# Patient Record
Sex: MALE | Race: WHITE | URBAN - METROPOLITAN AREA
[De-identification: names, ages, dates, MRNs, and addresses within clinical notes are randomized per-mention and may not be internally consistent; named-entity substitution may affect disease eponyms.]

---
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
## 2019-11-06 ENCOUNTER — OFFICE VISIT (OUTPATIENT)
Dept: FAMILY MEDICINE | Facility: CLINIC | Age: 19
End: 2019-11-06
Payer: COMMERCIAL

## 2019-11-06 VITALS
WEIGHT: 141.6 LBS | DIASTOLIC BLOOD PRESSURE: 72 MMHG | HEART RATE: 68 BPM | BODY MASS INDEX: 22.51 KG/M2 | SYSTOLIC BLOOD PRESSURE: 112 MMHG | RESPIRATION RATE: 16 BRPM

## 2019-11-06 DIAGNOSIS — L25.9 CONTACT DERMATITIS, UNSPECIFIED CONTACT DERMATITIS TYPE, UNSPECIFIED TRIGGER: ICD-10-CM

## 2019-11-06 DIAGNOSIS — R41.840 INATTENTION: Primary | ICD-10-CM

## 2019-11-06 PROCEDURE — 99214 OFFICE O/P EST MOD 30 MIN: CPT | Performed by: NURSE PRACTITIONER

## 2019-11-06 RX ORDER — PREDNISONE 20 MG/1
TABLET ORAL
Qty: 10 TABLET | Refills: 0 | Status: SHIPPED | OUTPATIENT
Start: 2019-11-06 | End: 2019-11-27

## 2019-11-06 NOTE — PROGRESS NOTES
Subjective     Hayden Redmond is a 19 year old male who presents to clinic today for the following health issues:    HPI   Medication Followup of Adderall - would like to restart, has not been on for more than a year    Rash  - x 3 weeks  -itchy red spots all over his whole body       Patient Active Problem List   Diagnosis     Major depressive disorder, recurrent episode, moderate (H)     No past surgical history on file.    Social History     Tobacco Use     Smoking status: Never Smoker     Smokeless tobacco: Never Used   Substance Use Topics     Alcohol use: Not on file     No family history on file.      Current Outpatient Medications   Medication Sig Dispense Refill     predniSONE (DELTASONE) 20 MG tablet Take one tablet twice a day for 5 days. 10 tablet 0     order for DME Equipment being ordered: Wrist/Thumb, UNIV, LT (Patient not taking: Reported on 11/6/2019) 1 Device 0     No Known Allergies  No lab results found.   BP Readings from Last 3 Encounters:   11/06/19 112/72   12/26/16 106/68   10/26/16 109/63    Wt Readings from Last 3 Encounters:   11/06/19 64.2 kg (141 lb 9.6 oz)   12/26/16 67.2 kg (148 lb 3.2 oz) (56 %)*   10/26/16 64.6 kg (142 lb 6.4 oz) (48 %)*     * Growth percentiles are based on CDC (Boys, 2-20 Years) data.                  Reviewed and updated as needed this visit by Provider         Review of Systems   ROS COMP: Constitutional, HEENT, cardiovascular, pulmonary, gi and gu systems are negative, except as otherwise noted.      Objective    /72 (BP Location: Right arm, Patient Position: Chair, Cuff Size: Adult Large)   Pulse 68   Resp 16   Wt 64.2 kg (141 lb 9.6 oz)   BMI 22.51 kg/m    Body mass index is 22.51 kg/m .  Physical Exam   GENERAL: healthy, alert and no distress  EYES: Eyes grossly normal to inspection, PERRL and conjunctivae and sclerae normal  HENT: ear canals and TM's normal, nose and mouth without ulcers or lesions  NECK: no adenopathy, no asymmetry, masses,  or scars and thyroid normal to palpation  RESP: lungs clear to auscultation - no rales, rhonchi or wheezes  CV: regular rate and rhythm, normal S1 S2, no S3 or S4, no murmur, click or rub, no peripheral edema and peripheral pulses strong  ABDOMEN: soft, nontender, no hepatosplenomegaly, no masses and bowel sounds normal  MS: no gross musculoskeletal defects noted, no edema  SKIN: no suspicious lesions. Examination of the rash to chest and arms  reveals:  dry, slightly raised, red patches   NEURO: Normal strength and tone, mentation intact and speech normal  PSYCH: mentation appears normal, affect normal/bright            Assessment & Plan     (P91.618) Inattention  (primary encounter diagnosis)  Comment: Patient states he has a history of ADHD and was on medications greater than a year ago.  Patient is unable to recall where he was evaluated and where he was prescribed the medications.  Did review with patient we will need to do a formal evaluation and assessment to see if there is truly underlying ADHD before prescribing any medications.  Referral has been made for him to have ADHD testing.  Plan: MENTAL HEALTH REFERRAL  - Adult; Assessments         and Testing; ADHD; [facility]: [facility] [phone]; We will contact you to         schedule the appointment or please call with         any questions      (G22.9) Contact dermatitis, unspecified contact dermatitis type, unspecified trigger  Comment: Rash most representative of contact dermatitis.  Small concerned that it could be scabies patient will be started on prednisone if not improving patient should contact me and we will consider treating him for scabies.  Plan: predniSONE (DELTASONE) 20 MG tablet       See Patient Instructions    Return in about 1 week (around 11/13/2019), or if symptoms worsen or fail to improve.    [facility]

## 2019-11-06 NOTE — NURSING NOTE
"Chief Complaint   Patient presents with     Recheck Medication     ADHD       Initial /72 (BP Location: Right arm, Patient Position: Chair, Cuff Size: Adult Large)   Pulse 68   Resp 16   Wt 64.2 kg (141 lb 9.6 oz)   BMI 22.51 kg/m   Estimated body mass index is 22.51 kg/m  as calculated from the following:    Height as of 4/26/16: 1.689 m (5' 6.5\").    Weight as of this encounter: 64.2 kg (141 lb 9.6 oz).    Patient presents to the clinic using No DME    Health Maintenance that is potentially due pending provider review:  NONE    Mey Hoff MA  11:33 AM 11/6/2019  .      "

## 2019-11-06 NOTE — PATIENT INSTRUCTIONS
"  Patient Education     Contact Dermatitis  Contact dermatitis is a skin rash caused by something that touches the skin and makes it irritated and inflamed. Your skin may be red, swollen, dry, and may be cracked. Blisters may form and ooze. The rash will itch.  Contact dermatitis can form on the face and neck, backs of hands, forearms, genitals, and lower legs.  People can get contact dermatitis from lots of sources. These include:    Plants such as poison ivy, oak, or sumac    Chemicals in hair dyes and rinses, soaps, solvents, waxes, fingernail polish, and deodorants     Jewelry or watchbands made of nickel  Contact dermatitis is not passed from person to person.  Talk with your healthcare provider about what may have caused the rash. A type of allergy testing called \"patch testing\" may be used to discover what you are allergic to. You will need to avoid the source of your rash in the future to prevent it from coming back.  Treatment is done to relieve itching and prevent the rash from coming back. The rash should go away in a few days to a few weeks.  Home care  Your healthcare provider may prescribe medicine to relieve swelling and itching. Follow all instructions when using these medicines.  General care:    Avoid anything that heats up your skin, such as hot showers or baths, or direct sunlight. This can make itching worse.    Apply cold compresses to soothe your sores to help relieve your symptoms. Do this for 30 minutes 3 to 4 times a day. You can make a cold compress by soaking a cloth in cold water. Squeeze out excess water. You can add colloidal oatmeal to the water to help reduce itching. For severe itching in a small area, apply an ice pack wrapped in a thin towel. Do this for 20 minutes 3 to 4 times a day.    You can also try wet dressings. One way to do this is to wear a wet piece of clothing under a dry one. Wear a damp shirt under a dry shirt if your upper body is affected. This can relieve itching " and prevent you from scratching the affected area.    You can also help relieve large areas of itching by taking a lukewarm bath with colloidal oatmeal added to the water.    Use hydrocortisone cream for redness and irritation, unless another medicine was prescribed. You can also use benzocaine anesthetic cream or spray. Calamine lotion can also relieve mild symptoms.    Use oral diphenhydramine to help reduce itching. You can buy this antihistamine at drug and grocery stores. It can make you sleepy, so use lower doses during the daytime. Or you can use loratadine. This is an antihistamine that will not make you sleepy. Do not use diphenhydramine if you have glaucoma or have trouble urinating due to an enlarged prostate.    If a plant causes your rash, make sure to wash your skin and the clothes you were wearing when you came into contact with the plant. This is to wash away the plant oils that gave you the rash and prevent more or worse symptoms.    Stay away from the substance or object that causes your symptoms. If you can t avoid it, wear gloves or some other type of protection.  Follow-up care  Follow up with your healthcare provider, or as advised.  When to seek medical advice  Call your healthcare provider right away if any of these occur:    Spreading of the rash to other parts of your body    Severe swelling of your face, eyelids, mouth, throat or tongue    Trouble urinating due to swelling in the genital area    Fever of 100.4 F (38 C) or higher    Redness or swelling that gets worse    Pain that gets worse    Foul-smelling fluid leaking from the skin    Yellow-brown crusts on the open blisters  Date Last Reviewed: 9/1/2016 2000-2018 [employer]. [address] [place] [address], [place] [address]. All rights reserved. This information is not intended as a substitute for professional medical care. Always follow your healthcare professional's instructions.

## 2019-11-18 ENCOUNTER — OFFICE VISIT (OUTPATIENT)
Dept: FAMILY MEDICINE | Facility: CLINIC | Age: 19
End: 2019-11-18
Payer: COMMERCIAL

## 2019-11-18 ENCOUNTER — NURSE TRIAGE (OUTPATIENT)
Dept: NURSING | Facility: CLINIC | Age: 19
End: 2019-11-18

## 2019-11-18 VITALS
HEIGHT: 68 IN | WEIGHT: 140.8 LBS | BODY MASS INDEX: 21.34 KG/M2 | TEMPERATURE: 97.7 F | DIASTOLIC BLOOD PRESSURE: 82 MMHG | SYSTOLIC BLOOD PRESSURE: 114 MMHG | HEART RATE: 64 BPM

## 2019-11-18 DIAGNOSIS — L25.9 CONTACT DERMATITIS, UNSPECIFIED CONTACT DERMATITIS TYPE, UNSPECIFIED TRIGGER: Primary | ICD-10-CM

## 2019-11-18 DIAGNOSIS — Z23 NEED FOR PROPHYLACTIC VACCINATION AND INOCULATION AGAINST INFLUENZA: ICD-10-CM

## 2019-11-18 DIAGNOSIS — Z20.7 SCABIES EXPOSURE: ICD-10-CM

## 2019-11-18 PROCEDURE — 99213 OFFICE O/P EST LOW 20 MIN: CPT | Performed by: NURSE PRACTITIONER

## 2019-11-18 RX ORDER — TRIAMCINOLONE ACETONIDE 1 MG/G
CREAM TOPICAL 2 TIMES DAILY
Qty: 45 G | Refills: 1 | Status: SHIPPED | OUTPATIENT
Start: 2019-11-18 | End: 2019-11-27

## 2019-11-18 RX ORDER — PERMETHRIN 50 MG/G
CREAM TOPICAL
Qty: 60 G | Refills: 1 | Status: SHIPPED | OUTPATIENT
Start: 2019-11-18 | End: 2019-11-27

## 2019-11-18 ASSESSMENT — MIFFLIN-ST. JEOR: SCORE: 1623.16

## 2019-11-18 ASSESSMENT — PATIENT HEALTH QUESTIONNAIRE - PHQ9: SUM OF ALL RESPONSES TO PHQ QUESTIONS 1-9: 17

## 2019-11-18 NOTE — LETTER
My Depression Action Plan  Name: Hayden Redmond   Date of Birth 2000  Date: 11/18/2019    [facility]   My clinic: [place] [id number] [place] [id number] [phone]          GREEN    ZONE   Good Control    What it looks like:     Things are going generally well. You have normal up s and down s. You may even feel depressed from time to time, but bad moods usually last less than a day.   What you need to do:  1. Continue to care for yourself (see self care plan)  2. Check your depression survival kit and update it as needed  3. Follow your physician s recommendations including any medication.  4. Do not stop taking medication unless you consult with your physician first.           YELLOW         ZONE Getting Worse    What it looks like:     Depression is starting to interfere with your life.     It may be hard to get out of bed; you may be starting to isolate yourself from others.    Symptoms of depression are starting to last most all day and this has happened for several days.     You may have suicidal thoughts but they are not constant.   What you need to do:     1. Call your care team, your response to treatment will improve if you keep your care team informed of your progress. Yellow periods are signs an adjustment may need to be made.     2. Continue your self-care, even if you have to fake it!    3. Talk to someone in your support network    4. Open up your depression survival kit           RED    ZONE Medical Alert - Get Help    What it looks like:     Depression is seriously interfering with your life.     You may experience these or other symptoms: You can t get out of bed most days, can t work or engage in other necessary activities, you have trouble taking care of basic hygiene, or basic responsibilities, thoughts of suicide or death that will not go away, self-injurious behavior.     What you need to do:  1. Call  your care team and request a same-day appointment. If they are not available (weekends or after hours) call your local crisis line, emergency room or 911.            Depression Self Care Plan / Survival Kit    Self-Care for Depression  Here s the deal. Your body and mind are really not as separate as most people think.  What you do and think affects how you feel and how you feel influences what you do and think. This means if you do things that people who feel good do, it will help you feel better.  Sometimes this is all it takes.  There is also a place for medication and therapy depending on how severe your depression is, so be sure to consult with your medical provider and/ or Behavioral Health Consultant if your symptoms are worsening or not improving.     In order to better manage my stress, I will:    Exercise  Get some form of exercise, every day. This will help reduce pain and release endorphins, the  feel good  chemicals in your brain. This is almost as good as taking antidepressants!  This is not the same as joining a gym and then never going! (they count on that by the way ) It can be as simple as just going for a walk or doing some gardening, anything that will get you moving.      Hygiene   Maintain good hygiene (Get out of bed in the morning, Make your bed, Brush your teeth, Take a shower, and Get dressed like you were going to work, even if you are unemployed).  If your clothes don't fit try to get ones that do.    Diet  I will strive to eat foods that are good for me, drink plenty of water, and avoid excessive sugar, caffeine, alcohol, and other mood-altering substances.  Some foods that are helpful in depression are: complex carbohydrates, B vitamins, flaxseed, fish or fish oil, fresh fruits and vegetables.    Psychotherapy  I agree to participate in Individual Therapy (if recommended).    Medication  If prescribed medications, I agree to take them.  Missing doses can result in serious side effects.   I understand that drinking alcohol, or other illicit drug use, may cause potential side effects.  I will not stop my medication abruptly without first discussing it with my provider.    Staying Connected With Others  I will stay in touch with my friends, family members, and my primary care provider/team.    Use your imagination  Be creative.  We all have a creative side; it doesn t matter if it s oil painting, sand castles, or mud pies! This will also kick up the endorphins.    Witness Beauty  (AKA stop and smell the roses) Take a look outside, even in mid-winter. Notice colors, textures. Watch the squirrels and birds.     Service to others  Be of service to others.  There is always someone else in need.  By helping others we can  get out of ourselves  and remember the really important things.  This also provides opportunities for practicing all the other parts of the program.    Humor  Laugh and be silly!  Adjust your TV habits for less news and crime-drama and more comedy.    Control your stress  Try breathing deep, massage therapy, biofeedback, and meditation. Find time to relax each day.     My support system    Clinic Contact:  Phone number:    Contact 1:  Phone number:    Contact 2:  Phone number:    [employer]/:  Phone number:    Therapist:  Phone number:    [facility] crisis center:    Phone number:    Other community support:  Phone number:

## 2019-11-18 NOTE — PROGRESS NOTES
Subjective     Hayden Redmond is a 19 year old male who presents to clinic today for the following health issues:    HPI   Derm problem       Duration: 1.5 months     Description  Location: all over the body except for face and neck   Itching: severe    Intensity:  moderate    Accompanying signs and symptoms: started out as water blisters on his hands, spreading, in between fingers as well, redness     History (similar episodes/previous evaluation): None    Precipitating or alleviating factors:  New exposures:  None  Recent travel: no    Has changed soaps, stopped using soaps, changed bedding     Therapies tried and outcome: Prednisone- helped until stopped taking it       Patient Active Problem List   Diagnosis     Major depressive disorder, recurrent episode, moderate (H)     History reviewed. No pertinent surgical history.    Social History     Tobacco Use     Smoking status: Never Smoker     Smokeless tobacco: Never Used   Substance Use Topics     Alcohol use: Not on file     History reviewed. No pertinent family history.      Current Outpatient Medications   Medication Sig Dispense Refill     permethrin (ELIMITE) 5 % external cream Apply cream from head to toe (except the face); leave on for 8-14 hours then wash off with water; reapply in 1 week if live mites appear. 60 g 1     triamcinolone (KENALOG) 0.1 % external cream Apply topically 2 times daily 45 g 1     order for DME Equipment being ordered: Wrist/Thumb, UNIV, LT (Patient not taking: Reported on 11/6/2019) 1 Device 0     predniSONE (DELTASONE) 20 MG tablet Take one tablet twice a day for 5 days. (Patient not taking: Reported on 11/18/2019) 10 tablet 0     No Known Allergies  No lab results found.   BP Readings from Last 3 Encounters:   11/18/19 114/82   11/06/19 112/72   12/26/16 106/68    Wt Readings from Last 3 Encounters:   11/18/19 63.9 kg (140 lb 12.8 oz)   11/06/19 64.2 kg (141 lb 9.6 oz)   12/26/16 67.2 kg (148 lb 3.2 oz) (56 %)*     *  "Growth percentiles are based on CDC (Boys, 2-20 Years) data.               Reviewed and updated as needed this visit by Provider         Review of Systems   ROS COMP: Constitutional, HEENT, cardiovascular, pulmonary, gi and gu systems are negative, except as otherwise noted.      Objective    /82   Pulse 64   Temp 97.7  F (36.5  C) (Tympanic)   Ht 1.727 m (5' 8\")   Wt 63.9 kg (140 lb 12.8 oz)   BMI 21.41 kg/m    Body mass index is 21.41 kg/m .  Physical Exam   GENERAL: healthy, alert and no distress  EYES: Eyes grossly normal to inspection, PERRL and conjunctivae and sclerae normal  NECK: no adenopathy, no asymmetry, masses, or scars and thyroid normal to palpation  RESP: lungs clear to auscultation - no rales, rhonchi or wheezes  CV: regular rate and rhythm, normal S1 S2, no S3 or S4, no murmur, click or rub, no peripheral edema and peripheral pulses strong  MS: no gross musculoskeletal defects noted, no edema  SKIN:  Examination of the rash to arms hands and groin reveals: Contact dermatitis  dry, slightly raised, red patches   NEURO: Normal strength and tone, mentation intact and speech normal  PSYCH: mentation appears normal, affect normal/bright       Assessment & Plan     (L25.9) Contact dermatitis, unspecified contact dermatitis type, unspecified trigger  (primary encounter diagnosis)  Comment: Symptoms also representative of contact dermatitis did have some mild improvement with the prednisone.  Will have patient try Kenalog and follow-up with dermatology since is not resolving  Plan: DERMATOLOGY REFERRAL, triamcinolone (KENALOG)         0.1 % external cream      (Z20.89) Scabies exposure  Comment: Small potential could be scabies will treat with Elimite  cream to rule out scabies p  roxy: permethrin (ELIMITE) 5 % external cream        (Z23) Need for prophylactic vaccination and inoculation against influenza  Comment:   Plan:        See Patient Instructions    Return in about 1 week (around " 11/25/2019), or if symptoms worsen or fail to improve.    [facility]

## 2019-11-18 NOTE — NURSING NOTE
"Chief Complaint   Patient presents with     Derm Problem     Imm/Inj     Flu Shot       Initial /82   Pulse 64   Temp 97.7  F (36.5  C) (Tympanic)   Ht 1.727 m (5' 8\")   Wt 63.9 kg (140 lb 12.8 oz)   BMI 21.41 kg/m   Estimated body mass index is 21.41 kg/m  as calculated from the following:    Height as of this encounter: 1.727 m (5' 8\").    Weight as of this encounter: 63.9 kg (140 lb 12.8 oz).    Patient presents to the clinic using No DME    Health Maintenance that is potentially due pending provider review:  PHQ9, Depression action plan    Possibly completing today per provider review. Pt declines flu shot today.    Is there anyone who you would like to be able to receive your results? No  If yes have patient fill out SNEHA      "

## 2019-11-18 NOTE — PATIENT INSTRUCTIONS
Patient Education     Understanding Contact Dermatitis     A cool, moist compress can help reduce itching.     Contact dermatitis is a common type of skin rash. It s caused by something that touches the skin and makes it irritated and inflamed. It can occur on skin on any part of the body, such as the face, neck, hands, arms, and legs. Contact dermatitis is not spread from person to person.  Often, the reaction of contact dermatitis occurs 1 to 2 days after contact with the offending agent.  How to say it  OKSANA-tact [id number]-DI-[id number]   What causes contact dermatitis?  It s caused by something that irritates the skin, or that creates an allergic reaction on the skin. People can get contact dermatitis from many kinds of things. These include:    Plant oils in poison ivy, oak, and sumac    Chemicals in household , solvents, and glue    Chemicals in makeup, soap, laundry detergent, perfume, acne cream, and hair products    Certain medicines, such as neomycin, bacitracin, benzocaine, and thimerosal    Metals such as nickel, found in some jewelry and watch bands     The sticky material on the back of bandages and tape (adhesive)    Things that can cause tiny breaks in the skin, such as wood, fiberglass, metal tools, and plant thorns    Rubber latex in surgical gloves and other medical supplies  Dermatitis can also be caused by the skin being damp for long periods of time. This can happen from washing your hands too often, or working with wet materials.  Symptoms of contact dermatitis  Symptoms can include skin that is:    Blistered    Burning    Cracked    Dry    Itchy    Painful    Red    Rough, thickened, and leathery    Swollen    Warm  The blisters may ooze fluid and form crusts.  Treatment for contact dermatitis  Treatment is done to help relieve itching and reduce inflammation. The rash should go away in a few days to a few weeks. Treatments include:    Cool, moist compress. Use a clean damp cloth. Put it on  the area for 20 to 30 minutes, 5 to 6 times a day for the first 3 days.    Steroid cream or ointment. You can apply this medicine several times a day on clean skin.    Oral corticosteroid. Your healthcare provider may prescribe this medicine if you have severe skin symptoms on a large part of your body.  Your healthcare provider may give you a steroid injection instead of pills.    Oral antihistamine. This medicine can help reduce itching.    Colloidal oatmeal bath. Soaking in water with colloidal oatmeal can help soothe skin.    Plain cream, lotion, or ointment. Cream, lotion, or ointment without medicine can help to soothe and protect your skin.  Living with contact dermatitis  Talk with your healthcare provider about what may have caused your contact dermatitis. Patch testing may help you figure out what caused the rash so you can avoid further contact with it. Once you learn what caused your rash, make sure to avoid that substance. If your skin comes into contact with it again, make sure to wash your skin right away. If you can t avoid the substance, wear gloves or other protective clothing before you touch it. Or use a cream, lotion, or ointment to protect your skin.  When to call your healthcare provider  Call your healthcare provider right away if you have any of these:    Fever of 100.4 F (38 C) or higher, or as directed    Symptoms that don t get better, or get worse    New symptoms   Date Last Reviewed: 5/1/2016 2000-2018 [employer]. [address] [place] [address], [place] [address]. All rights reserved. This information is not intended as a substitute for professional medical care. Always follow your healthcare professional's instructions.

## 2019-11-18 NOTE — TELEPHONE ENCOUNTER
Patient asking to clarify what time his appointment is this date.  Asking if there is any way to push appointment back 2 hours; transferred to scheduling for assistance.

## 2019-11-27 ENCOUNTER — OFFICE VISIT (OUTPATIENT)
Dept: FAMILY MEDICINE | Facility: CLINIC | Age: 19
End: 2019-11-27
Payer: COMMERCIAL

## 2019-11-27 VITALS
WEIGHT: 143 LBS | TEMPERATURE: 97.8 F | RESPIRATION RATE: 16 BRPM | HEART RATE: 92 BPM | SYSTOLIC BLOOD PRESSURE: 122 MMHG | BODY MASS INDEX: 21.67 KG/M2 | HEIGHT: 68 IN | DIASTOLIC BLOOD PRESSURE: 72 MMHG

## 2019-11-27 DIAGNOSIS — N50.89 GENITAL LESION, MALE: Primary | ICD-10-CM

## 2019-11-27 PROCEDURE — 87529 HSV DNA AMP PROBE: CPT | Performed by: NURSE PRACTITIONER

## 2019-11-27 PROCEDURE — 99213 OFFICE O/P EST LOW 20 MIN: CPT | Performed by: NURSE PRACTITIONER

## 2019-11-27 SDOH — HEALTH STABILITY: MENTAL HEALTH: HOW OFTEN DO YOU HAVE A DRINK CONTAINING ALCOHOL?: NEVER

## 2019-11-27 ASSESSMENT — MIFFLIN-ST. JEOR: SCORE: 1633.14

## 2019-11-27 NOTE — PROGRESS NOTES
"Subjective     Hayden Redmond is a 19 year old male who presents to clinic today for the following health issues:    HPI   STI Check - x 1 mo has rash in groin area. If wearing tight pants makes things worse. Had has \"pimple\" like spots.  Did resolve when on prednisone but came back.     Was started on prednisone and rash resolved, returned when the prednisone course was complete.  Then used permethrin and symptoms resolved.  New lesions started on shaft of penis only.  No new sexual partners.  Started out looking like \"pimples\".  Wore tight jeans and symptoms worsened.     Patient Active Problem List   Diagnosis     Major depressive disorder, recurrent episode, moderate (H)     History reviewed. No pertinent surgical history.    Social History     Tobacco Use     Smoking status: Never Smoker     Smokeless tobacco: Never Used   Substance Use Topics     Alcohol use: Yes     Frequency: Never     History reviewed. No pertinent family history.      No current outpatient medications on file.     No Known Allergies    Reviewed and updated as needed this visit by Provider  Tobacco  Allergies  Meds  Problems  Med Hx  Surg Hx  Fam Hx         Review of Systems   ROS COMP: Constitutional, HEENT, cardiovascular, pulmonary, gi and gu systems are negative, except as otherwise noted.      Objective    /72 (Cuff Size: Adult Regular)   Pulse 92   Temp 97.8  F (36.6  C) (Tympanic)   Resp 16   Ht 1.727 m (5' 8\")   Wt 64.9 kg (143 lb)   BMI 21.74 kg/m    Body mass index is 21.74 kg/m .  Physical Exam   GENERAL: healthy, alert and no distress   (male): erythematous excoriated papules present on shaft of penis  NEURO: Normal strength and tone, mentation intact and speech normal  PSYCH: mentation appears normal, affect normal/bright    Diagnostic Test Results:  Labs reviewed in Epic        Assessment & Plan     1. Genital lesion, male  Concern for HSV with symptoms, unsure if previous symptoms related to current " symptoms.  Will swab for HSV, consider blood testing in 6 weeks if desired. Symptomatic care and follow up discussed.  - HSV 1 and 2 DNA by PCR     Home care instructions were reviewed with the patient. The risks, benefits and treatment options of prescribed medications or other treatments have been discussed with the patient. The patient verbalized their understanding and should call or follow up if no improvement or if they develop further problems.    Return in about 1 week (around 12/4/2019), or if symptoms worsen or fail to improve.    TERRI Mckinnon [facility]

## 2019-11-29 LAB
HSV1 DNA SPEC QL NAA+PROBE: NEGATIVE
HSV2 DNA SPEC QL NAA+PROBE: NEGATIVE
SPECIMEN SOURCE: NORMAL

## 2019-12-31 ENCOUNTER — APPOINTMENT (OUTPATIENT)
Dept: OCCUPATIONAL MEDICINE | Facility: CLINIC | Age: 19
End: 2019-12-31

## 2019-12-31 PROCEDURE — 99000 SPECIMEN HANDLING OFFICE-LAB: CPT | Performed by: FAMILY MEDICINE

## 2020-02-07 ENCOUNTER — OFFICE VISIT (OUTPATIENT)
Dept: PSYCHOLOGY | Facility: CLINIC | Age: 20
End: 2020-02-07
Payer: COMMERCIAL

## 2020-02-07 DIAGNOSIS — F90.2 ADHD (ATTENTION DEFICIT HYPERACTIVITY DISORDER), COMBINED TYPE: Primary | ICD-10-CM

## 2020-02-07 PROCEDURE — 90834 PSYTX W PT 45 MINUTES: CPT | Performed by: PSYCHOLOGIST

## 2020-02-12 ASSESSMENT — ANXIETY QUESTIONNAIRES
2. NOT BEING ABLE TO STOP OR CONTROL WORRYING: NEARLY EVERY DAY
GAD7 TOTAL SCORE: 8
5. BEING SO RESTLESS THAT IT IS HARD TO SIT STILL: NOT AT ALL
7. FEELING AFRAID AS IF SOMETHING AWFUL MIGHT HAPPEN: NOT AT ALL
4. TROUBLE RELAXING: SEVERAL DAYS
6. BECOMING EASILY ANNOYED OR IRRITABLE: SEVERAL DAYS
1. FEELING NERVOUS, ANXIOUS, OR ON EDGE: MORE THAN HALF THE DAYS
3. WORRYING TOO MUCH ABOUT DIFFERENT THINGS: SEVERAL DAYS

## 2020-02-12 ASSESSMENT — PATIENT HEALTH QUESTIONNAIRE - PHQ9: SUM OF ALL RESPONSES TO PHQ QUESTIONS 1-9: 17

## 2020-02-12 NOTE — PROGRESS NOTES
Progress Note - Initial Session  Disclaimer: Voice recognition software was used to generate this note. As a result, wrong word or 'sound-a-like' substitutions may have occurred due to the inherent limitations of voice recognition software. There may be errors in the script that have gone undetected. Please consider this when interpreting information found in this chart.     Client Name:  Hayden Redmond Date: 2/7/2020         Service Type: Individual  Video Visit: No     Session Start Time: 10:30 AM  session End Time: 11:15 AM     Session Length: 45    Session #: 1    Attendees: Client attended alone     DATA:  Diagnostic Assessment in progress.  Unable to complete documentation at the conclusion of the first session due to needing more information.  Client is present for session ADHD evaluation.  Reports he was diagnosed at age 15 and was on Adderall for about 18 months.  After he graduated from high school he largely forgot about the diagnosis and medication.  Complaints of procrastination on nearly everything.  Difficulty getting things done, particularly at work.  Difficulty making decisions.  Finds it difficult to sit through TV or movies unless something really catches his interest.  He has changed jobs about 6 times due to boredom.    Interactive Complexity: No  Crisis: No    Intervention:  Assessment for ADHD and psychoeducation regarding attention and concentration problems    ASSESSMENT:  Mental Status Assessment:  Appearance:   Appropriate   Eye Contact:   Good   Psychomotor Behavior: Restless   Attitude:   Cooperative   Orientation:   All  Speech   Rate / Production: Normal    Volume:  Normal   Mood:    Normal  Affect:    Appropriate   Thought Content:  Clear   Thought Form:  Coherent  Logical   Insight:    Good       Safety Issues and Plan for Safety and Risk Management:  Client denies current fears or concerns for personal safety.  Client denies current or recent suicidal ideation  or behaviors.  Client denies current or recent homicidal ideation or behaviors.  Client denies current or recent self injurious behavior or ideation.  Client denies other safety concerns.  Recommended that patient call 911 or go to the local ED should there be a change in any of these risk factors.  Client reports there are no firearms in the house.      Diagnostic Criteria:  A) A persistent pattern of inattention and/or hyperactivity-impulsivity that interferes with functioning or development, as characterized by (1) Inattention and/or (2) Hyperactivity and Impulsivity  (1) Inattention: 6 or more of the following symptoms have persisted for at least 6 months to a degree that is inconsistent with developmental level and that negatively impacts directly on social and academic/occupational activities:  - Often fails to give close attention to details or makes careless mistakes in schoolwork, at work, or during other activities  - Often has difficulty sustaining attention in tasks or play activities  - Often does not seem to listen when spoken to directly  - Often does not follow through on instructions and fails to finish schoolwork, chores, or duties in the workplace  - Often has difficulty organizing tasks and activities  - Often avoids, dislikes, or is reluctant to engage in tasks that require sustained mental effort  - Often loses things necessary for tasks or activities  - Is often easily distractedby extraneous stimuli  - Is often forgetful in daily activities  (2) Hyeractivity and Impulsivity: 6 or more of the following symptoms have persisted for at least 6 months to a degree that is inconsistent with developmental level and that negatively impacts directly on social and academic/occupational activities:  - Often leaves seat in situations when remaining seated is expected  - Often unable to play or engage in leisure activities quietly  B) Several inattentive or hyperactive-impulsive symptoms were present prior  to age 11 years  C) Several inattentive or hyperactive-impulsive symptoms are present in two or more settings  D) There is clear evidence that the symptoms interfere with, or reduce the quality of, social academic, or occupational functioning  E) The Symptoms do not occur exclusively during the course of schizophrenia or another psychotic disorder and are not better explained by another mental disorder      DSM5 Diagnoses: (Sustained by DSM5 Criteria Listed Above)  Diagnoses: Attention-Deficit/Hyperactivity Disorder  314.01 (F90.2) Combined presentation  Psychosocial & Contextual Factors: Work stress.  Little to no contact with his mother due to maternal substance use  Collateral Reports Completed:  Not Applicable      PLAN: (Homework, other):  Client stated that he may follow up for ongoing services with [facility].  Follow-up appointments have been set.  Client was given [place] ADHD scales for self and collateral to be completed for next session.      Lanre West

## 2020-02-13 ASSESSMENT — ANXIETY QUESTIONNAIRES: GAD7 TOTAL SCORE: 8

## 2020-02-20 ENCOUNTER — OFFICE VISIT (OUTPATIENT)
Dept: PSYCHOLOGY | Facility: CLINIC | Age: 20
End: 2020-02-20
Payer: COMMERCIAL

## 2020-02-20 DIAGNOSIS — F90.2 ADHD (ATTENTION DEFICIT HYPERACTIVITY DISORDER), COMBINED TYPE: Primary | ICD-10-CM

## 2020-02-20 PROCEDURE — 90834 PSYTX W PT 45 MINUTES: CPT | Performed by: PSYCHOLOGIST

## 2020-02-26 NOTE — PROGRESS NOTES
Adult Intake Structured Interview  Disclaimer: This note consists of symbols derived from keyboarding, dictation and/or voice recognition software. As a result, there may be errors in the script that have gone undetected. Please consider this when interpreting information found in this chart.    CLIENT'S NAME: Hayden Redmond  MRN:   [id number]  :   2000  ACCT. NUMBER: [id number]  DATE OF SERVICE: 20      Identifying Information:  Client is a 19 year old, , partnered / significant other male. Client was referred for a diagnostic assessment by self.  The purpose of this evaluation is to: clarify diagnosis.  Client is currently employed full time and reports @HIS@ is able to function appropriately at work.. Client attended the session alone.       Client's Statement of Presenting Concern:  Client reported seeking services at this time for diagnostic assessment and recommendations for treatment.  Reports he was diagnosed at age 15 and was on Adderall for about 18 months.  After he graduated from high school he largely forgot about the diagnosis and medication.  Complaints of procrastination on nearly everything.  Difficulty getting things done, particularly at work.  Difficulty making decisions.  Finds it difficult to sit through TV or movies unless something really catches his interest.  He has changed jobs about 6 times due to boredom.Client stated that his symptoms have resulted in the following functional impairments: relationship(s) and work / vocational responsibilities.      History of Presenting Concern:  Client reported that he has completed a previous ADHD diagnostic assessment at the age of 15.  Client has received a previous diagnosis of ADHD.  Client has been prescribed medication to address these problems.  Client reported that medication was helpful and did not cause unpleasant side effects.   Client reports after graduating from high school he stopped taking the medication and largely forgot about it.  Client reported that these problem(s) began childhood. Client has attempted to resolve these concerns in the past through Medications as noted above. Client reported that other professional(s) are not involved in providing support / services.       Social History:  Client reported he grew up in [place], [place]. Client was the first born of 7 children.Client noted he is the only child of both of his parents, the rest of his siblings are from different relationships.  He noted that father and mother were never together and that he has had no contact with his mother and the older year due to her substance use .   Client reported that his childhood was Difficult until he got into high school.   He was largely raised by his grandparents.    Client reported a history of 1 committed relationships or marriages. Client has been partnered / significant other for 4 years. Client reported having no children.  Client identified some stable and meaningful social connections. Client reported that he has not been involved with the legal system.      Client's highest education level was high school graduate.   Client did not serve in the .   There are no ethnic, cultural or [place]ar[place] factors that may be relevant for therapy. Client identified his preferred language to be English. Client reported he does not need the assistance of an  or other support involved in treatment. Modifications will not be used to assist communication in treatment.     Client reports family history is not on file.      Mental Health History:  Client Observed that his mother may possibly have bipolar disorder, his younger sister has experienced severe depression..  Client previously received the following mental health diagnosis: ADHD and Depression.  Client has received the following mental health services in the  past: medication(s) from physician / PCP. Hospitalizations: None.  Previous / current commitments: None. Client is not currently receiving any mental health services.      Chemical Health History:  Client Reported mother has had problems with alcohol and drugs. Client has not received chemical dependency treatment in the past. Client is not currently receiving any chemical dependency treatment. Client reports no problems as a result of their drinking / drug use.  Client reported that he       Client Reports:  Client reports drinking 4-5 beers perhaps once or twice per month  Client uses a nicotine vapor pen  Client denies using marijuana.  Client reports approximately 5 energy drinks consumed each day  Client denies using street drugs.  Client denies the non-medical use of prescription or over the counter drugs.    CAGE: None of the patient's responses to the CAGE screening were positive / Negative CAGE score   Based on the negative Cage-Aid score and clinical interview there  are not indications of drug or alcohol abuse.    Discussed the general effects of drugs and alcohol on health and well-being. Therapist gave client printed information about the effects of chemical use on his health and well being.    ADHD Symptom History  During the elementary, middle, and high school years, patient recalls academic strengths in the area of math and History and chemistry. Client reported experiencing academic problems in reading. Client did not identify any learning problems. Client did receive tutoring services during the school years. Client did not receive special education services. Client reported significant behavior and discipline problems including: suspension or expulsion from school, physical or verbal alteracations and disruptive classroom behavior. Client did not attend post secondary school.  Client described his childhood family environment as dysfunctional.  Client reported no difficulty with childhood peer  relationships. As a child, client reported that he failed to complete assigned chores in the home environment, had problems with organization and keeping track of items, misplaced or lost things, forgot school work or other items between home and school, needed frequent reminders by parents to be motivated or to complete work and displayed argumentative or oppositional behaviors.   Client reported that  is currently employed. Client reported that the current job is a good fit for his skills and personality.  Client reported that he frequently made mistakes with poor attention to detail, disorganized behavior, distractible behavior and Displaced poor time management, often forgets things, has had multiple jobs due to getting bored easily .The client's work history includes: , construction, [employer], .  The longest period of employment has been 18 months.  Client has not been terminated from a place of employment.    As a child, client reported having regular and consistent sleep patterns.  Client reported currently experiencing sleep disturbance, including: daytime drowsiness / fatigue and insomnia.  Client reported sleeping approximately 5 hours per night.  Client reported that he has not completed a sleep study.  Client reported having an inconsistent diet.  There are not significant nutritional concerns.  Client reported engaging in regular exercise.  These largely occur while he w is as at wor.  K    Risk Taking Behaviors:  Client reported the following current risk taking behaviors: risky sexual behaviors      Motor Vehicle Operation:  Client has received a 's license.  Client has received moving violations, includin speeding tickets.  Client reported the following driving habits: gets lost easily and often exceeds the speed limit / speeds.  According to client, other people are comfortable riding as a passenger when he is driving.    Significant Losses / Trauma / Abuse /  Neglect Issues:  There are indications or report of significant loss, trauma, abuse or neglect issues related to: client's experience of neglect Father lived away, brother had drug and alcohol problems, client raised by his maternal grandparents.    Issues of possible neglect are not present.      Medical Issues:  Client has had a physical exam to rule out medical causes for current symptoms.  Date of last physical exam was within the past year. Client was encouraged to follow up with PCP if symptoms were to develop.  The client does not have a Primary Care Provider and was encouraged to establish care with a PCP..  Client reports not having a psychiatrist.  Client reported no current medical concerns.  The client denies the presence of chronic or episodic pain.  Patient reports not taking any current medications    Client Allergies:  No Known Allergies  no known allergies to medications    Medical History:  No past medical history on file.    Medication Adherence:  N/A - Client does not have prescribed psychiatric medications.    Client was provided recommendation to follow-up with prescribing physician.        Mental Status Assessment:  Appearance:   Appropriate   Eye Contact:   Good   Psychomotor Behavior: Restless   Attitude:   Cooperative   Orientation:   All  Speech   Rate / Production: Normal    Volume:  Normal   Mood:    Normal  Affect:    Appropriate   Thought Content:  Clear   Thought Form:  Coherent  Logical   Insight:    Fair       Review of Symptoms:  Depression: Sleep Interest Energy Concentration Psychomotor slowing or agitation Hopeless  Dariana:  No symptoms  Psychosis: No symptoms  Anxiety: Worries  Panic:  No symptoms  Post Traumatic Stress Disorder: No symptoms  Obsessive Compulsive Disorder: No symptoms  Eating Disorder: No symptoms  Oppositional Defiant Disorder: No symptoms  ADD / ADHD: Attention Listening Distractiblity Interrupts  Conduct Disorder: No symptoms  Reckless Behavior: Substance  Abuse Unprotected Sex        Safety Issues and Plan for Safety and Risk Management:  Client denies a history of suicidal ideation, suicide attempts, self-injurious behavior, homicidal ideation, homicidal behavior and and other safety concerns    Client denies current fears or concerns for personal safety.  Client denies current or recent suicidal ideation or behaviors.  Client denies current or recent homicidal ideation or behaviors.  Client denies current or recent self injurious behavior or ideation.  Client denies other safety concerns.  Client reports there are no firearms in the house.  Recommended that patient call 911 or go to the local ED should there be a change in any of these risk factors.      Patient's Strengths and Limitations:  Client identified the following strengths or resources that will help him succeed in counseling: positive work environment. Client identified the following supports: friends. Things that may interfere with the clients success in counseling include:financial hardship.      Diagnostic Criteria:  A) A persistent pattern of inattention and/or hyperactivity-impulsivity that interferes with functioning or development, as characterized by (1) Inattention and/or (2) Hyperactivity and Impulsivity  (1) Inattention: 6 or more of the following symptoms have persisted for at least 6 months to a degree that is inconsistent with developmental level and that negatively impacts directly on social and academic/occupational activities:  - Often has difficulty sustaining attention in tasks or play activities  - Often does not seem to listen when spoken to directly  - Often has difficulty organizing tasks and activities  - Often avoids, dislikes, or is reluctant to engage in tasks that require sustained mental effort  - Often loses things necessary for tasks or activities  - Is often easily distractedby extraneous stimuli  (2) Hyeractivity and Impulsivity: 6 or more of the following symptoms have  persisted for at least 6 months to a degree that is inconsistent with developmental level and that negatively impacts directly on social and academic/occupational activities:  - Often fidgets with or taps hands or feet or squirms in seat  - Often leaves seat in situations when remaining seated is expected  - Often runs about or climbs in situationswhere it is inappropriate  - Often blurts out an answer before a question has been completed  B) Several inattentive or hyperactive-impulsive symptoms were present prior to age 12 years  C) Several inattentive or hyperactive-impulsive symptoms are present in two or more settings  D) There is clear evidence that the symptoms interfere with, or reduce the quality of, social academic, or occupational functioning  E) The Symptoms do not occur exclusively during the course of schizophrenia or another psychotic disorder and are not better explained by another mental disorder      Functional Status:  Client's symptoms are causing reduced functional status in the following areas: Occupational / Vocational - Too many things going on at once  Social / Relational - Needed to be reminded to do things constantly      DSM5 Diagnoses: (Sustained by DSM5 Criteria Listed Above)  Diagnoses: Attention-Deficit/Hyperactivity Disorder  314.01 (F90.2) Combined presentation;   Psychosocial & Contextual Factors:Work stress.  Little to no contact with his mother due to maternal substance use.    WHODAS 2.0 (12 item)            This questionnaire asks about difficulties due to health conditions. Health conditions  Include disease or illnesses, other health problems that may be short or long lasting,  injuries, mental health or emotional problems, and problems with alcohol or drugs.                     Think back over the past 30 days and answer these questions, thinking about how much  difficulty you had doing the following activities. For each question, please [place] only one  response.    S1  Standing for long periods such as 30 minutes? None =         1   S2 Taking care of household responsibilities? Mild =           2   S3 Learning a new task, for example, learning how to get to a new place? None =         1   S4 How much of a problem do you have joining community activities (for example, festivals, Jehovah's witness or other activities) in the same way as anyone else can? Extreme / or cannot do = 5   S5 How much have you been emotionally affected by your health problems? Severe =       4     In the past 30 days, how much difficulty did you have in:   S6 Concentrating on doing something for ten minutes? Severe =       4   S7 Walking a long distance such as a kilometer (or equivalent)? Severe =       4   S8 Washing your whole body? None =         1   S9 Getting dressed? None =         1   S10 Dealing with people you do not know? Moderate =   3   S11 Maintaining a friendship? Mild =           2   S12 Your day to day work? Severe =       4     H1 Overall, in the past 30 days, how many days were these difficulties present? Record number of days 30   H2 In the past 30 days, for how many days were you totally unable to carry out your usual activities or work because of any health condition? Record number of days 15   H3 In the past 30 days, not counting the days that you were totally unable, for how many days did you cut back or reduce your usual activities or work because of any health condition? Record number of days 15     Attendance Agreement:  Client has signed Attendance Agreement:Yes      Preliminary Plan:  The client reports no currently identified Jehovah's witness, ethnic or cultural issues relevant to therapy.     services are not indicated.    Modifications to assist communication are not indicated.    The concerns identified by the client will be addressed in therapy.    Collaboration / coordination with other professionals is not indicated at this time.    Referral to another professional/service is  not indicated at this time..    A Release of Information is not needed at this time.    Client was given self and collaborative rating scales to be completed prior to the next appointment.  Depression and anxiety rating scales were completed.  Copies of previous diagnostic assessment were requested.  A second appointment was scheduled at this time.       Report to child / adult protection services was NA.    Patient will have open access to their mental health medical record.    Lanre West  February 26, 2020

## 2020-03-24 ENCOUNTER — VIRTUAL VISIT (OUTPATIENT)
Dept: FAMILY MEDICINE | Facility: CLINIC | Age: 20
End: 2020-03-24
Payer: COMMERCIAL

## 2020-03-24 DIAGNOSIS — R09.81 NASAL CONGESTION: Primary | ICD-10-CM

## 2020-03-24 PROCEDURE — 99441 ZZC PHYSICIAN TELEPHONE EVALUATION 5-10 MIN: CPT | Performed by: PHYSICIAN ASSISTANT

## 2020-03-24 NOTE — PROGRESS NOTES
"Hayden Redmond is a 19 year old male who is being evaluated via a billable telephone visit.      The patient has been notified of following:     \"This telephone visit will be conducted via a call between you and your physician/provider. We have found that certain health care needs can be provided without the need for a physical exam.  This service lets us provide the care you need with a short phone conversation.  If a prescription is necessary we can send it directly to your pharmacy.  If lab work is needed we can place an order for that and you can then stop by our lab to have the test done at a later time.    If during the course of the call the physician/provider feels a telephone visit is not appropriate, you will not be charged for this service.\"     Hayden Redmond complains of    Chief Complaint   Patient presents with     Nasal congestion     I have reviewed and updated the patient's Past Medical History, Social History, Family History and Medication List.    ALLERGIES  Patient has no known allergies.  Allergies      Duration: 3-4  days    Description (location/character/radiation): swollen glands, stuffy, runny nose, headaches.    Intensity:  moderate    Accompanying signs and symptoms: worse in the mornings    History (similar episodes/previous evaluation): None    Precipitating or alleviating factors: None    Therapies tried and outcome: benadryl at night   Not using any other medications.   Denies any cough, sob, fevers, chills.      Assessment/Plan:  1. Nasal congestion  Symptoms mostly consistent with URI at this time.   No cough, fevers, sob, but discussed that this could be consistent with coronavirus. Since no cough or fever, did not recommend isolation at this time, but instructed to monitor symptoms and if he were to develop those symptoms he needs to self quarantine. For now, use Ibuprofen, Tylenol, nasal sprays and a daily allergy pill. RTC prn for any new, changing or worsening " symptoms.      Phone call duration:  5 minutes    Moises Latif PA-C

## 2020-04-07 ENCOUNTER — VIRTUAL VISIT (OUTPATIENT)
Dept: FAMILY MEDICINE | Facility: CLINIC | Age: 20
End: 2020-04-07
Payer: COMMERCIAL

## 2020-04-07 DIAGNOSIS — F33.1 MODERATE EPISODE OF RECURRENT MAJOR DEPRESSIVE DISORDER (H): ICD-10-CM

## 2020-04-07 DIAGNOSIS — F90.2 ATTENTION DEFICIT HYPERACTIVITY DISORDER (ADHD), COMBINED TYPE: Primary | ICD-10-CM

## 2020-04-07 PROCEDURE — 99442 ZZC PHYSICIAN TELEPHONE EVALUATION 11-20 MIN: CPT | Performed by: NURSE PRACTITIONER

## 2020-04-07 RX ORDER — ATOMOXETINE 40 MG/1
CAPSULE ORAL
Qty: 53 CAPSULE | Refills: 0 | Status: SHIPPED | OUTPATIENT
Start: 2020-04-07 | End: 2020-05-07

## 2020-04-07 ASSESSMENT — ANXIETY QUESTIONNAIRES
6. BECOMING EASILY ANNOYED OR IRRITABLE: NEARLY EVERY DAY
1. FEELING NERVOUS, ANXIOUS, OR ON EDGE: SEVERAL DAYS
2. NOT BEING ABLE TO STOP OR CONTROL WORRYING: NEARLY EVERY DAY
5. BEING SO RESTLESS THAT IT IS HARD TO SIT STILL: NOT AT ALL
3. WORRYING TOO MUCH ABOUT DIFFERENT THINGS: NEARLY EVERY DAY
7. FEELING AFRAID AS IF SOMETHING AWFUL MIGHT HAPPEN: SEVERAL DAYS
GAD7 TOTAL SCORE: 12

## 2020-04-07 ASSESSMENT — PATIENT HEALTH QUESTIONNAIRE - PHQ9
5. POOR APPETITE OR OVEREATING: SEVERAL DAYS
SUM OF ALL RESPONSES TO PHQ QUESTIONS 1-9: 13

## 2020-04-07 NOTE — PROGRESS NOTES
"Subjective     Hayden Redmond is a 19 year old male who is being evaluated via a billable telephone visit.      The patient has been notified of following:     \"This telephone visit will be conducted via a call between you and your physician/provider. We have found that certain health care needs can be provided without the need for a physical exam.  This service lets us provide the care you need with a short phone conversation.  If a prescription is necessary we can send it directly to your pharmacy.  If lab work is needed we can place an order for that and you can then stop by our lab to have the test done at a later time.    Telephone visits are billed at different rates depending on your insurance coverage. During this emergency period, for some insurers they may be billed the same as an in-person visit.  Please reach out to your insurance provider with any questions.    If during the course of the call the physician/provider feels a telephone visit is not appropriate, you will not be charged for this service.\"    Patient has given verbal consent for Telephone visit?  Yes    Hayden Redmond complains of   Chief Complaint   Patient presents with     Depression       ALLERGIES  Patient has no known allergies.    Depression Followup    How are you doing with your depression since your last visit? No change    Are you having other symptoms that might be associated with depression? No    Have you had a significant life event?  YES     Are you feeling anxious or having panic attacks?   Yes:  .    Do you have any concerns with your use of alcohol or other drugs? No    Social History     Tobacco Use     Smoking status: Never Smoker     Smokeless tobacco: Never Used   Substance Use Topics     Alcohol use: Yes     Frequency: Never     Drug use: Never     PHQ 10/24/2016 11/18/2019 2/12/2020   PHQ-9 Total Score 14 17 17   Q9: Thoughts of better off dead/self-harm past 2 weeks Not at all Not at all Not at all     MARCELO-7 " SCORE 10/24/2016 2/12/2020   Total Score 14 8     Last PHQ-9 4/7/2020   1.  Little interest or pleasure in doing things 0   2.  Feeling down, depressed, or hopeless 0   3.  Trouble falling or staying asleep, or sleeping too much 3   4.  Feeling tired or having little energy 3   5.  Poor appetite or overeating 1   6.  Feeling bad about yourself 0   7.  Trouble concentrating 3   8.  Moving slowly or restless 3   Q9: Thoughts of better off dead/self-harm past 2 weeks 0   PHQ-9 Total Score 13   Difficulty at work, home, or with people -     In the past two weeks have you had thoughts of suicide or self-harm?  No.    Do you have concerns about your personal safety or the safety of others?   No    Suicide Assessment Five-step Evaluation and Treatment (SAFE-T)          Patient Active Problem List   Diagnosis     Major depressive disorder, recurrent episode, moderate (H)     History reviewed. No pertinent surgical history.    Social History     Tobacco Use     Smoking status: Never Smoker     Smokeless tobacco: Never Used   Substance Use Topics     Alcohol use: Yes     Frequency: Never     History reviewed. No pertinent family history.      Current Outpatient Medications   Medication Sig Dispense Refill     atomoxetine (STRATTERA) 40 MG capsule Take 1 capsule (40 mg) by mouth daily for 7 days, THEN 2 capsules (80 mg) daily for 23 days. 53 capsule 0     No Known Allergies  No lab results found.   BP Readings from Last 3 Encounters:   11/27/19 122/72   11/18/19 114/82   11/06/19 112/72    Wt Readings from Last 3 Encounters:   11/27/19 64.9 kg (143 lb)   11/18/19 63.9 kg (140 lb 12.8 oz)   11/06/19 64.2 kg (141 lb 9.6 oz)                    Reviewed and updated as needed this visit by Provider         Review of Systems   ROS COMP: Constitutional, HEENT, cardiovascular, pulmonary, gi and gu systems are negative, except as otherwise noted.       Objective   Reported vitals:  There were no vitals taken for this visit.   healthy,  alert and no distress  Psych: Alert and oriented times 3; coherent speech, normal   rate and volume, able to articulate logical thoughts, able   to abstract reason, no tangential thoughts, no hallucinations   or delusions  His affect is Normal              Assessment/Plan:  1. Attention deficit hyperactivity disorder (ADHD), combined type  Patient is ADHD uncontrolled will start back on Strattera  - atomoxetine (STRATTERA) 40 MG capsule; Take 1 capsule (40 mg) by mouth daily for 7 days, THEN 2 capsules (80 mg) daily for 23 days.  Dispense: 53 capsule; Refill: 0    2. Moderate episode of recurrent major depressive disorder (H)  Depression uncontrolled will start back on Strattera to see if that helps with his ADHD and his depression if not next steps would be to add Lexapro  - atomoxetine (STRATTERA) 40 MG capsule; Take 1 capsule (40 mg) by mouth daily for 7 days, THEN 2 capsules (80 mg) daily for 23 days.  Dispense: 53 capsule; Refill: 0    Return in about 1 week (around 4/14/2020) for ADHD depression.      Phone call duration:  18 minutes    TERRI South CNP

## 2020-04-08 ASSESSMENT — ANXIETY QUESTIONNAIRES: GAD7 TOTAL SCORE: 12

## 2020-04-12 NOTE — PATIENT INSTRUCTIONS
Patient Education     Attention-Deficit/Hyperactivity Disorder (ADHD) in Adults  You ve always had trouble concentrating. Your mind wanders, and it s hard to finish tasks. As a result, you didn t do well in school. And now, you often struggle with your job. Sometimes this makes you vance or depressed. These may be symptoms of attention-deficit/hyperactivity disorder (ADHD). To find out more, talk to your healthcare provider. He or she can offer guidance and support.  Symptoms  of ADHD in adults  For an adult to be diagnosed with ADHD, the symptoms must have been present since childhood. The symptoms may include:    Trouble thinking things through    Low self-esteem    Depression    Trouble holding a job    Memory problems    Problems with a marriage or relationship    Lack of discipline   What is ADHD?  Attention-deficit/hyperactivity disorder makes it hard to focus your mind. You may daydream a lot. And you may be restless much of the time. As a result, you may have trouble with detailed or boring work. And it may be hard to stick with one project for very long. You also may forget things. Or, you may miss key points during a lecture or meeting. You may even have trouble sitting through a movie or concert. At times, you may feel frustrated or angry. This can affect your relationships with others.  Who does it affect?  ADHD starts in childhood. Sometimes, your symptoms may improve as you get older. But they also may persist into your adult years. ADHD is often thought of as a  kid s problem.  That s why it s often missed in adults. In fact, many parents learn they have ADHD when their children are diagnosed.  What causes it?  The exact cause of ADHD isn t known. The disorder does run in families. Having one parent with ADHD makes it more likely you ll have it too. And the part of your brain that controls attention may be involved. Certain brain chemicals that are out of balance may also play a role.  What can be  done?  The first step is finding out if you really have ADHD. Your doctor will use special guidelines to diagnose the disorder. Most adults with ADHD are greatly helped by therapy and coaching. In some cases, your doctor may also prescribe medicine to ease your symptoms.  Resources    National Resource Center on AD/HDwww.vjdv6wqmz.org    Attention Deficit Disorder Associationwww.add.org   Date Last Reviewed: 1/1/2017 2000-2019 [employer]. [address] [place] [address]. All rights reserved. This information is not intended as a substitute for professional medical care. Always follow your healthcare professional's instructions.           Patient Education     Depression  Depression is one of the most common mental health problems today. It is not just a state of unhappiness or sadness. It is a true disease. The cause seems to be related to a decrease in chemicals that transmit signals in the brain. Having a family history of depression, alcoholism, or suicide increases the risk. Chronic illness, chronic pain, migraine headaches, and high emotional stress also increase the risk.  Depression is something we tend to recognize in others, but may have a hard time seeing in ourselves. It can show in many physical and emotional ways:    Loss of appetite    Overeating    Not being able to sleep    Sleeping too much    Tiredness not related to physical exertion    Restlessness or irritability    Slowness of movement or speech    Feeling depressed or withdrawn    Loss of interest in things you once enjoyed    Trouble concentrating, poor memory, trouble making decisions    Thoughts of harming or killing oneself, or thoughts that life is not worth living    Low self-esteem  The treatment for depression may include both medicine and psychotherapy. Antidepressants can reduce suffering and can improve the ability to function during the depressed period. Therapy can offer emotional support and help you  understand emotional factors that may be causing the depression.  Home care    Ongoing care and support help people manage this disease. Find a healthcare provider and therapist who meet your needs. Seek help when you feel like you may be getting ill.    Be kind to yourself. Make it a point to do things that you enjoy (gardening, walking in nature, going to a movie). Reward yourself for small successes.    Take care of your physical body. Eat a balanced diet (low in saturated fat and high in fruits and vegetables). Exercise at least 3 times a week for 30 minutes. Even mild-moderate exercise (like brisk walking) can make you feel better.    Don't drink alcohol, which can make depression worse.    Take medicine as prescribed.    Tell each of your healthcare providers about all of the prescription and over-the-counter medicines, vitamins, and supplements you take. Certain supplements interact with medicines and can result in dangerous side effects. Ask your pharmacist when you have questions about medicine interactions.    Talk with your family and trusted friends about your feelings and thoughts. Ask them to help you recognize behavior changes early so you can get help and, if needed, medicine can be adjusted.    Follow-up care  Follow up with your healthcare provider, or as advised.  Call 911  Call 911 if you:    Have suicidal thoughts, a suicide plan, and the means to carry out the plan; or serious thoughts of hurting someone else     Have trouble breathing    Are very confused    Feel very drowsy or have trouble awakening    Faint or lose consciousness    Have new chest pain that becomes more severe, lasts longer, or spreads into your shoulder, arm, neck, jaw, or back  When to seek medical advice  Call your healthcare provider right away if any of these happen:    Feeling extreme depression, fear, anxiety, or anger toward yourself or others    Feeling out of control    Feeling that you may try to harm yourself or  another    Hearing voices that others do not hear    Seeing things that others do not see    Can t sleep or eat for 3 days in a row    Friends or family express concern over your behavior and ask you to seek help  Date Last Reviewed: 10/1/2017    7105-8067 [employer]. [address] [place] [address]. All rights reserved. This information is not intended as a substitute for professional medical care. Always follow your healthcare professional's instructions.

## 2020-05-05 ENCOUNTER — VIRTUAL VISIT (OUTPATIENT)
Dept: FAMILY MEDICINE | Facility: CLINIC | Age: 20
End: 2020-05-05
Payer: COMMERCIAL

## 2020-05-05 DIAGNOSIS — Z53.9 NO SHOW: Primary | ICD-10-CM

## 2020-05-05 ASSESSMENT — ANXIETY QUESTIONNAIRES
1. FEELING NERVOUS, ANXIOUS, OR ON EDGE: SEVERAL DAYS
3. WORRYING TOO MUCH ABOUT DIFFERENT THINGS: SEVERAL DAYS
5. BEING SO RESTLESS THAT IT IS HARD TO SIT STILL: NOT AT ALL
6. BECOMING EASILY ANNOYED OR IRRITABLE: MORE THAN HALF THE DAYS
GAD7 TOTAL SCORE: 6
7. FEELING AFRAID AS IF SOMETHING AWFUL MIGHT HAPPEN: NOT AT ALL
2. NOT BEING ABLE TO STOP OR CONTROL WORRYING: SEVERAL DAYS

## 2020-05-05 ASSESSMENT — PATIENT HEALTH QUESTIONNAIRE - PHQ9
5. POOR APPETITE OR OVEREATING: SEVERAL DAYS
SUM OF ALL RESPONSES TO PHQ QUESTIONS 1-9: 19

## 2020-05-05 NOTE — PROGRESS NOTES
"Hayden Redmond is a 19 year old male who is being evaluated via a billable video visit.      The patient has been notified of following:     \"This video visit will be conducted via a call between you and your physician/provider. We have found that certain health care needs can be provided without the need for an in-person physical exam.  This service lets us provide the care you need with a video conversation.  If a prescription is necessary we can send it directly to your pharmacy.  If lab work is needed we can place an order for that and you can then stop by our lab to have the test done at a later time.    Video visits are billed at different rates depending on your insurance coverage.  Please reach out to your insurance provider with any questions.    If during the course of the call the physician/provider feels a video visit is not appropriate, you will not be charged for this service.\"    Patient has given verbal consent for Video visit? Yes    How would you like to obtain your AVS? Mail a copy    Patient would like the video invitation sent by: Text to cell phone: [phone]    Will anyone else be joining your video visit? No        Subjective     Hayden Redmond is a 19 year old male who presents to clinic today for the following health issues:    HPI  Depression Followup    How are you doing with your depression since your last visit? No change    Are you having other symptoms that might be associated with depression? No    Have you had a significant life event?  No     Are you feeling anxious or having panic attacks?   No    Do you have any concerns with your use of alcohol or other drugs? No    Social History     Tobacco Use     Smoking status: Never Smoker     Smokeless tobacco: Never Used   Substance Use Topics     Alcohol use: Yes     Frequency: Never     Drug use: Never     PHQ 2/12/2020 4/7/2020 5/5/2020   PHQ-9 Total Score 17 13 19   Q9: Thoughts of better off dead/self-harm past 2 weeks " Not at all Not at all Not at all     MARCELO-7 SCORE 2/12/2020 4/7/2020 5/5/2020   Total Score 8 12 6     Last PHQ-9 5/5/2020   1.  Little interest or pleasure in doing things 3   2.  Feeling down, depressed, or hopeless 1   3.  Trouble falling or staying asleep, or sleeping too much 3   4.  Feeling tired or having little energy 3   5.  Poor appetite or overeating 3   6.  Feeling bad about yourself 0   7.  Trouble concentrating 3   8.  Moving slowly or restless 3   Q9: Thoughts of better off dead/self-harm past 2 weeks 0   PHQ-9 Total Score 19   Difficulty at work, home, or with people -     MARCELO-7  5/5/2020   1. Feeling nervous, anxious, or on edge 1   2. Not being able to stop or control worrying 1   3. Worrying too much about different things 1   4. Trouble relaxing 1   5. Being so restless that it is hard to sit still 0   6. Becoming easily annoyed or irritable 2   7. Feeling afraid, as if something awful might happen 0   MARCELO-7 Total Score 6     In the past two weeks have you had thoughts of suicide or self-harm?  No.    Do you have concerns about your personal safety or the safety of others?   No    Suicide Assessment Five-step Evaluation and Treatment (SAFE-T)      How many servings of fruits and vegetables do you eat daily?  0-1    On average, how many sweetened beverages do you drink each day (Examples: soda, juice, sweet tea, etc.  Do NOT count diet or artificially sweetened beverages)?   3    How many days per week do you exercise enough to make your heart beat faster? 3 or less    How many minutes a day do you exercise enough to make your heart beat faster? 9 or less    How many days per week do you miss taking your medication? 0         Video Start Time: 1:01 PM    Attempted to contact patient to Aj baugh contacted him via phone 2 messages were left patient to call me here at the clinic to follow-up on his visit unable to get hold the patient.    Patient Active Problem List   Diagnosis     Major  depressive disorder, recurrent episode, moderate (H)     History reviewed. No pertinent surgical history.    Social History     Tobacco Use     Smoking status: Never Smoker     Smokeless tobacco: Never Used   Substance Use Topics     Alcohol use: Yes     Frequency: Never     History reviewed. No pertinent family history.      Current Outpatient Medications   Medication Sig Dispense Refill     atomoxetine (STRATTERA) 40 MG capsule Take 1 capsule (40 mg) by mouth daily for 7 days, THEN 2 capsules (80 mg) daily for 23 days. 53 capsule 0     No Known Allergies  No lab results found.   BP Readings from Last 3 Encounters:   11/27/19 122/72   11/18/19 114/82   11/06/19 112/72    Wt Readings from Last 3 Encounters:   11/27/19 64.9 kg (143 lb)   11/18/19 63.9 kg (140 lb 12.8 oz)   11/06/19 64.2 kg (141 lb 9.6 oz)                    Reviewed and updated as needed this visit by Provider       This patient was a no show for this scheduled appointment.

## 2020-05-06 ASSESSMENT — ANXIETY QUESTIONNAIRES: GAD7 TOTAL SCORE: 6

## 2022-01-30 ENCOUNTER — HOSPITAL ENCOUNTER (EMERGENCY)
Facility: CLINIC | Age: 22
Discharge: HOME | End: 2022-01-30
Attending: FAMILY MEDICINE | Admitting: FAMILY MEDICINE
Payer: COMMERCIAL

## 2022-01-30 VITALS
SYSTOLIC BLOOD PRESSURE: 104 MMHG | DIASTOLIC BLOOD PRESSURE: 77 MMHG | OXYGEN SATURATION: 96 % | WEIGHT: 155 LBS | TEMPERATURE: 97.7 F | HEART RATE: 73 BPM

## 2022-01-30 DIAGNOSIS — K52.9 GASTROENTERITIS: ICD-10-CM

## 2022-01-30 LAB
ALBUMIN SERPL-MCNC: 4.5 G/DL (ref 3.4–5)
ALP SERPL-CCNC: 72 U/L (ref 40–150)
ALT SERPL W P-5'-P-CCNC: 44 U/L (ref 0–70)
ANION GAP SERPL CALCULATED.3IONS-SCNC: 7 MMOL/L (ref 3–14)
AST SERPL W P-5'-P-CCNC: 17 U/L (ref 0–45)
BASOPHILS # BLD AUTO: 0 10E3/UL (ref 0–0.2)
BASOPHILS NFR BLD AUTO: 0 %
BILIRUB SERPL-MCNC: 0.5 MG/DL (ref 0.2–1.3)
BUN SERPL-MCNC: 18 MG/DL (ref 7–30)
CALCIUM SERPL-MCNC: 9.5 MG/DL (ref 8.5–10.1)
CHLORIDE BLD-SCNC: 108 MMOL/L (ref 94–109)
CO2 SERPL-SCNC: 24 MMOL/L (ref 20–32)
CREAT SERPL-MCNC: 0.79 MG/DL (ref 0.66–1.25)
DEPRECATED S PYO AG THROAT QL EIA: NEGATIVE
EOSINOPHIL # BLD AUTO: 0.1 10E3/UL (ref 0–0.7)
EOSINOPHIL NFR BLD AUTO: 1 %
ERYTHROCYTE [DISTWIDTH] IN BLOOD BY AUTOMATED COUNT: 11.7 % (ref 10–15)
GFR SERPL CREATININE-BSD FRML MDRD: >90 ML/MIN/1.73M2
GLUCOSE BLD-MCNC: 120 MG/DL (ref 70–99)
GROUP A STREP BY PCR: NOT DETECTED
HCT VFR BLD AUTO: 47 % (ref 40–53)
HGB BLD-MCNC: 16.9 G/DL (ref 13.3–17.7)
HOLD SPECIMEN: NORMAL
IMM GRANULOCYTES # BLD: 0.1 10E3/UL
IMM GRANULOCYTES NFR BLD: 0 %
LYMPHOCYTES # BLD AUTO: 1.6 10E3/UL (ref 0.8–5.3)
LYMPHOCYTES NFR BLD AUTO: 8 %
MCH RBC QN AUTO: 28.8 PG (ref 26.5–33)
MCHC RBC AUTO-ENTMCNC: 36 G/DL (ref 31.5–36.5)
MCV RBC AUTO: 80 FL (ref 78–100)
MONOCYTES # BLD AUTO: 1 10E3/UL (ref 0–1.3)
MONOCYTES NFR BLD AUTO: 5 %
NEUTROPHILS # BLD AUTO: 16.8 10E3/UL (ref 1.6–8.3)
NEUTROPHILS NFR BLD AUTO: 86 %
NRBC # BLD AUTO: 0 10E3/UL
NRBC BLD AUTO-RTO: 0 /100
PLATELET # BLD AUTO: 418 10E3/UL (ref 150–450)
POTASSIUM BLD-SCNC: 3.9 MMOL/L (ref 3.4–5.3)
PROT SERPL-MCNC: 8.2 G/DL (ref 6.8–8.8)
RBC # BLD AUTO: 5.86 10E6/UL (ref 4.4–5.9)
SODIUM SERPL-SCNC: 139 MMOL/L (ref 133–144)
WBC # BLD AUTO: 19.6 10E3/UL (ref 4–11)

## 2022-01-30 PROCEDURE — 36415 COLL VENOUS BLD VENIPUNCTURE: CPT | Performed by: EMERGENCY MEDICINE

## 2022-01-30 PROCEDURE — 80053 COMPREHEN METABOLIC PANEL: CPT | Performed by: FAMILY MEDICINE

## 2022-01-30 PROCEDURE — 96374 THER/PROPH/DIAG INJ IV PUSH: CPT | Performed by: FAMILY MEDICINE

## 2022-01-30 PROCEDURE — 99284 EMERGENCY DEPT VISIT MOD MDM: CPT | Performed by: FAMILY MEDICINE

## 2022-01-30 PROCEDURE — 85025 COMPLETE CBC W/AUTO DIFF WBC: CPT | Performed by: FAMILY MEDICINE

## 2022-01-30 PROCEDURE — 96361 HYDRATE IV INFUSION ADD-ON: CPT | Performed by: FAMILY MEDICINE

## 2022-01-30 PROCEDURE — 99284 EMERGENCY DEPT VISIT MOD MDM: CPT | Mod: 25 | Performed by: FAMILY MEDICINE

## 2022-01-30 PROCEDURE — 87651 STREP A DNA AMP PROBE: CPT | Performed by: FAMILY MEDICINE

## 2022-01-30 PROCEDURE — 258N000003 HC RX IP 258 OP 636: Performed by: FAMILY MEDICINE

## 2022-01-30 PROCEDURE — 250N000011 HC RX IP 250 OP 636: Performed by: FAMILY MEDICINE

## 2022-01-30 RX ORDER — ONDANSETRON 2 MG/ML
4 INJECTION INTRAMUSCULAR; INTRAVENOUS EVERY 30 MIN PRN
Status: DISCONTINUED | OUTPATIENT
Start: 2022-01-30 | End: 2022-01-30 | Stop reason: HOSPADM

## 2022-01-30 RX ADMIN — ONDANSETRON 4 MG: 2 INJECTION INTRAMUSCULAR; INTRAVENOUS at 13:06

## 2022-01-30 RX ADMIN — SODIUM CHLORIDE, POTASSIUM CHLORIDE, SODIUM LACTATE AND CALCIUM CHLORIDE 1000 ML: 600; 310; 30; 20 INJECTION, SOLUTION INTRAVENOUS at 13:05

## 2022-01-30 NOTE — ED NOTES
Upon arrival, patient had no rash.  60 minutes in patient presented with a rash on his trunk, chest, neck and left bicep.  Denies itchiness.  At 1345 patient was checked and rash is beginning to resolve.  MD notified.

## 2022-01-30 NOTE — DISCHARGE INSTRUCTIONS
I think this likely is a stomach flu virus rather than food poisoning.  Regardless you should eat a bland starchy diet and avoid heavy greasy food and dairy products until you are better.  I would like you to return to the emergency department if you develop abdominal pain particularly located in one spot that does not go away, vomiting recurs, or other concerning symptoms develop

## 2022-01-30 NOTE — ED PROVIDER NOTES
History     Chief Complaint   Patient presents with     Abdominal Pain     Nausea, Vomiting, & Diarrhea     HPI  Hayden Redmond is a 21 year old male who comes in with nausea, vomiting, diarrhea.  Symptoms began at 12:30 in the morning.  He attributed them to food poisoning from eating at [employer].  He had a salad and lasagna.  He is having crampy abdominal discomfort but no focal pain.  No bloody stools.  He has not noticed the rash on his body.  He takes no prescription medications.  He has no known drug allergies.  He is never had any abdominal surgeries.  He is not have any respiratory symptoms including no cough or shortness of breath.  He is generally otherwise healthy.    Allergies:  No Known Allergies    Problem List:    There are no problems to display for this patient.       Past Medical History:    No past medical history on file.    Past Surgical History:    No past surgical history on file.    Family History:    No family history on file.    Social History:  Marital Status:  Single [1]  Social History     Tobacco Use     Smoking status: Not on file     Smokeless tobacco: Not on file   Substance Use Topics     Alcohol use: Not on file     Drug use: Not on file        Medications:    No current outpatient medications on file.        Review of Systems  All other systems are reviewed and are negative    Physical Exam   BP: 112/73  Pulse: 65  Temp: 97.7  F (36.5  C)  Weight: 70.3 kg (155 lb)  SpO2: 97 %      Physical Exam    Nursing note and vitals were reviewed.  Constitutional: Awake and alert, adequately nourished and developed appearing 21-year-old in moderate discomfort, who appears moderately ill but nontoxic , and who answers questions appropriately and cooperates with examination.  HEENT: EOMI.   Neck: Freely mobile.  Cardiovascular: Cardiac examination reveals normal heart rate and regular rhythm without murmur.  Pulmonary/Chest: Breathing is unlabored.  Breath sounds are clear and equal  bilaterally.  There no retractions, tachypnea, rales, wheezes, or rhonchi.  Abdomen: Soft, nontender, no HSM or masses rebound or guarding.  Musculoskeletal: Extremities are warm and well-perfused and without edema  Neurological: Alert, oriented, thought content logical, coherent   Skin: Warm, dry.  There is an erythematous rash of primarily diffuse erythema that involves both nipples, the lower abdomen, the groin and the upper chest and neck.  No mucous membrane involvement.  Somewhat scarlatiniform in appearance but with the wrong distribution.  He was unaware of it.  Psychiatric: Affect congruent with acute illness.              ED Course                 Procedures              Critical Care time:  none               Results for orders placed or performed during the hospital encounter of 01/30/22 (from the past 24 hour(s))   [facility] Draw    Narrative    The following orders were created for panel order [facility] [facility].  Procedure                               Abnormality         Status                     ---------                               -----------         ------                     Extra Blue Top Tube[[id number]]                              Final result               Extra Red Top Tube[[id number]]                               Final result               Extra Green Top (Lithium...[[id number]]                      Final result               Extra Purple Top Tube[[id number]]                            Final result                 Please view results for these tests on the individual orders.   Extra Blue Top Tube   Result Value Ref Range    Hold Specimen JIC    Extra Red Top Tube   Result Value Ref Range    Hold Specimen JIC    Extra Green Top (Lithium Heparin) Tube   Result Value Ref Range    Hold Specimen JIC    Extra Purple Top Tube   Result Value Ref Range    Hold Specimen JIC    CBC with platelets differential    Narrative    The following orders were created for panel order CBC with platelets  differential.  Procedure                               Abnormality         Status                     ---------                               -----------         ------                     CBC with platelets and d...[[id number]]  Abnormal            Final result                 Please view results for these tests on the individual orders.   Comprehensive metabolic panel   Result Value Ref Range    Sodium 139 133 - 144 mmol/L    Potassium 3.9 3.4 - 5.3 mmol/L    Chloride 108 94 - 109 mmol/L    Carbon Dioxide (CO2) 24 20 - 32 mmol/L    Anion Gap 7 3 - 14 mmol/L    Urea Nitrogen 18 7 - 30 mg/dL    Creatinine 0.79 0.66 - 1.25 mg/dL    Calcium 9.5 8.5 - 10.1 mg/dL    Glucose 120 (H) 70 - 99 mg/dL    Alkaline Phosphatase 72 40 - 150 U/L    AST 17 0 - 45 U/L    ALT 44 0 - 70 U/L    Protein Total 8.2 6.8 - 8.8 g/dL    Albumin 4.5 3.4 - 5.0 g/dL    Bilirubin Total 0.5 0.2 - 1.3 mg/dL    GFR Estimate >90 >60 mL/min/1.73m2   CBC with platelets and differential   Result Value Ref Range    WBC Count 19.6 (H) 4.0 - 11.0 10e3/uL    RBC Count 5.86 4.40 - 5.90 10e6/uL    Hemoglobin 16.9 13.3 - 17.7 g/dL    Hematocrit 47.0 40.0 - 53.0 %    MCV 80 78 - 100 fL    MCH 28.8 26.5 - 33.0 pg    MCHC 36.0 31.5 - 36.5 g/dL    RDW 11.7 10.0 - 15.0 %    Platelet Count 418 150 - 450 10e3/uL    % Neutrophils 86 %    % Lymphocytes 8 %    % Monocytes 5 %    % Eosinophils 1 %    % Basophils 0 %    % Immature Granulocytes 0 %    NRBCs per 100 WBC 0 <1 /100    Absolute Neutrophils 16.8 (H) 1.6 - 8.3 10e3/uL    Absolute Lymphocytes 1.6 0.8 - 5.3 10e3/uL    Absolute Monocytes 1.0 0.0 - 1.3 10e3/uL    Absolute Eosinophils 0.1 0.0 - 0.7 10e3/uL    Absolute Basophils 0.0 0.0 - 0.2 10e3/uL    Absolute Immature Granulocytes 0.1 <=0.4 10e3/uL    Absolute NRBCs 0.0 10e3/uL   Streptococcus A Rapid Screen w/Reflex to PCR    Specimen: Throat; Swab   Result Value Ref Range    Group A Strep antigen Negative Negative       Medications   ondansetron (ZOFRAN) injection  4 mg (4 mg Intravenous Given 1/30/22 1306)   lactated ringers BOLUS 1,000 mL (0 mLs Intravenous Stopped 1/30/22 1357)       Assessments & Plan (with Medical Decision Making)     21-year-old male presented with sudden onset of nausea, vomiting, diarrhea.  He attributed the symptoms to food poisoning from [employer].  I think it is more likely has a viral gastroenteritis.  He had a benign abdominal examination and did not have significant abdominal pain.  Laboratory testing indicated an elevated white blood cell count of 19,200.  I suspect this represents stress demargination.  I reexamined his abdomen twice during his time in the emergency department and he remains without any tenderness in the abdomen.  At the time of discharge he had no abdominal pain.  While acute appendicitis can occasionally present with stomach flu type symptoms my suspicion for this is low at this time.  However I warned him that if he develops focal persistent abdominal pain or has recurrence of vomiting or other worrisome symptoms would like him to return to the emergency department.  He had a significant rash as in the photos above.  By the time of discharge this had faded considerably.  I suspect this is a viral exanthem.  Rapid strep testing was negative.  We discussed dietary management. I reviewed my recommendations with him and he expressed understanding and his questions were all answered.    I have reviewed the nursing notes.    I have reviewed the findings, diagnosis, plan and need for follow up with the patient.       New Prescriptions    No medications on file       Final diagnoses:   Gastroenteritis       1/30/2022   [facility] EMERGENCY DEPT     Nic Potter MD  01/30/22 1980